# Patient Record
Sex: FEMALE | Race: BLACK OR AFRICAN AMERICAN
[De-identification: names, ages, dates, MRNs, and addresses within clinical notes are randomized per-mention and may not be internally consistent; named-entity substitution may affect disease eponyms.]

---

## 2020-12-15 ENCOUNTER — HOSPITAL ENCOUNTER (EMERGENCY)
Dept: HOSPITAL 72 - EMR | Age: 45
LOS: 1 days | Discharge: HOME | End: 2020-12-16
Payer: COMMERCIAL

## 2020-12-15 VITALS — DIASTOLIC BLOOD PRESSURE: 101 MMHG | SYSTOLIC BLOOD PRESSURE: 152 MMHG

## 2020-12-15 VITALS — WEIGHT: 135 LBS | BODY MASS INDEX: 23.92 KG/M2 | HEIGHT: 63 IN

## 2020-12-15 DIAGNOSIS — M54.5: Primary | ICD-10-CM

## 2020-12-15 PROCEDURE — 96372 THER/PROPH/DIAG INJ SC/IM: CPT

## 2020-12-15 PROCEDURE — 99283 EMERGENCY DEPT VISIT LOW MDM: CPT

## 2020-12-15 NOTE — EMERGENCY ROOM REPORT
History of Present Illness


General


Chief Complaint:  Back Pain-No Injury


Source:  Patient





Present Illness


HPI


This is a 44-year-old female with history of herniated disc in her neck and also

some degenerative disc of her lumbar spine.  This was from an MRI a couple years

ago.  She presents with chief complaint of lower back pain.  She was getting out

of shower yesterday and was reaching for something and felt sharp pain to her 

lower back on the left side.  It radiated down her buttock and her back part of 

her thigh.  Now she felt some numbness to her leg.  Worse with certain position.

 Worse with movement.  No incontinence of bowel or urine.  No direct trauma.  

Pain is 8 out of 10.  Better with over-the-counter medication and Flexeril.  

Denies any fever or chills.


Allergies:  


Coded Allergies:  


     No Known Allergies (Unverified , 12/15/20)





COVID-19 Screening


Contact w/high risk pt:  No


Experienced COVID-19 symptoms?:  No


COVID-19 Testing performed PTA:  Yes - june 2020


COVID-19 Screening:  Negative COVID-19


COVID-19 Testing Source:  [facility]





Patient History


Past Medical History:  see triage record, old chart reviewed


Past Surgical History:  none


Pertinent Family History:  none


Social History:  Denies: smoking


Last Menstrual Period:  november 2020


Pregnant Now:  No


Immunizations:  other


Reviewed Nursing Documentation:  PMH: Agreed; PSxH: Agreed





Nursing Documentation-PMH


Past Medical History:  No History, Except For





Review of Systems


Eye:  Denies: eye pain, blurred vision


ENT:  Denies: ear pain, nose congestion, throat swelling


Respiratory:  Denies: cough, shortness of breath


Cardiovascular:  Denies: chest pain, palpitations


Gastrointestinal:  Denies: abdominal pain, diarrhea, nausea, vomiting


Musculoskeletal:  Reports: back pain; Denies: joint pain


Skin:  Denies: rash


Neurological:  Denies: headache, numbness


Endocrine:  Denies: increased thirst, increased urine


Hematologic/Lymphatic:  Denies: easy bruising


All Other Systems:  negative except mentioned in HPI





Physical Exam





Vital Signs








  Date Time  Temp Pulse Resp B/P (MAP) Pulse Ox O2 Delivery O2 Flow Rate FiO2


 


12/15/20 23:19 98.8 92 19 152/101 (118) 99 Room Air  





Vitals with high blood pressure


Sp02 EP Interpretation:  reviewed, normal


General Appearance:  well appearing, no apparent distress, alert


Head:  normocephalic, atraumatic


Eyes:  bilateral eye PERRL, bilateral eye EOMI


ENT:  hearing grossly normal, normal pharynx


Neck:  full range of motion, supple, no meningismus


Respiratory:  chest non-tender, lungs clear, normal breath sounds


Cardiovascular #1:  regular rate, rhythm, no murmur


Gastrointestinal:  normal bowel sounds, non tender, no mass, no organomegaly, no

bruit, non-distended


Musculoskeletal:  back normal, normal range of motion, gait/station normal, 

tender - TTP to left lower lumbar area


Psychiatric:  mood/affect normal





Medical Decision Making


Diagnostic Impression:  


   Primary Impression:  


   Lumbar pain with radiation down left leg


ER Course


Patient presents with lower back pain with radiculopathy.  No evidence of cauda 

equina syndrome, spinal epidural abscess or neoplastic process.





Last Vital Signs








  Date Time  Temp Pulse Resp B/P (MAP) Pulse Ox O2 Delivery O2 Flow Rate FiO2


 


12/15/20 23:22 98.8 82 19 152/101 99 Room Air  








Status:  unchanged


Disposition:  HOME, SELF-CARE


Condition:  Stable


Scripts


Prednisone* (PREDNISONE*) 20 Mg Tablet


40 MG ORAL DAILY, #14 TAB


   Prov: Geovani Mattson MD         12/15/20 


Oxycodone/Acetaminophen 5-325* (PERCOCET 5-325 MG TABLET*) 1 Each Tablet


1 TAB ORAL Q6H PRN for For Pain, #20 TAB


   Prov: Geovani Mattson MD         12/15/20


Referrals:  


NON PHYSICIAN (PCP)


Patient Instructions:  Back Pain, Adult





Additional Instructions:  


No heavy lifting.  Follow-up with your doctor in 7 days.  You may need referral 

for rehab and physical therapy.  Return if symptoms worsen.











Geovani Mattson MD                  Dec 15, 2020 23:47

## 2020-12-16 VITALS — SYSTOLIC BLOOD PRESSURE: 152 MMHG | DIASTOLIC BLOOD PRESSURE: 101 MMHG

## 2023-07-13 NOTE — NUR
ED Nurse Note:

Bonnie walked into ED c/o generalized back pain onset for 1 day now, patient 
reports of stepping out of the shower,bent down and reached for an object and 
immediately felt a sharp pain,. patient reports of pain radiating from her 
lower back to her left hip. patient is alert and oriented x4, has prior history 
of back pain due to slipped discs. will continue to monitor no edema, no murmurs, regular rate and rhythm